# Patient Record
Sex: FEMALE | Race: WHITE | NOT HISPANIC OR LATINO | Employment: FULL TIME | URBAN - METROPOLITAN AREA
[De-identification: names, ages, dates, MRNs, and addresses within clinical notes are randomized per-mention and may not be internally consistent; named-entity substitution may affect disease eponyms.]

---

## 2022-04-24 ENCOUNTER — OFFICE VISIT (OUTPATIENT)
Dept: FAMILY MEDICINE | Facility: CLINIC | Age: 37
End: 2022-04-24
Payer: COMMERCIAL

## 2022-04-24 VITALS — SYSTOLIC BLOOD PRESSURE: 125 MMHG | TEMPERATURE: 100.3 F | DIASTOLIC BLOOD PRESSURE: 91 MMHG | HEART RATE: 62 BPM

## 2022-04-24 DIAGNOSIS — R07.0 THROAT PAIN: Primary | ICD-10-CM

## 2022-04-24 LAB
CARTRIDGE EXP DATE, RAPID STREP: NORMAL
CARTRIDGE LOT NUMBER, RAPID STREP: NORMAL
DEPRECATED S PYO AG THROAT QL EIA: NEGATIVE
INTERNAL QC, RAPID STREP: NORMAL

## 2022-04-24 PROCEDURE — 87651 STREP A DNA AMP PROBE: CPT | Performed by: NURSE PRACTITIONER

## 2022-04-24 PROCEDURE — 99203 OFFICE O/P NEW LOW 30 MIN: CPT

## 2022-04-24 ASSESSMENT — PAIN SCALES - GENERAL: PAINLEVEL: SEVERE PAIN (6)

## 2022-04-24 NOTE — PROGRESS NOTES
Assessment & Plan     Throat pain  Rapid strep negative. Is here on business till 4/27/22. Will need to call if PCR is positive. Will not call if PCR is negative. No news is good news. Declined COVID test.   - Streptococcus A Rapid Scr w Reflx to PCR  - Group A Streptococcus PCR Throat Swab      15 minutes spent on the date of the encounter doing chart review, patient visit and documentation        See Patient Instructions    Return in about 1 week (around 5/1/2022), or if symptoms worsen or fail to improve.    [facility]    Aleksandr Lewis is a 37 year old who presents for the following health issues     HPI     Acute Illness  Acute illness concerns: sore throat  Onset/Duration: 2 days  Symptoms:  Fever: YES- today in clinic  Chills/Sweats: no  Headache (location?): YES  Sinus Pressure: no  Conjunctivitis:  no  Ear Pain: no  Rhinorrhea: YES  Congestion: YES  Sore Throat: YES  Cough: no  Wheeze: no  Decreased Appetite: no  Nausea: no  Vomiting: no  Diarrhea: no  Fatigue/Achiness: YES  Sick/Strep Exposure: no  Therapies tried and outcome: aleve      Review of Systems   Constitutional, HEENT, cardiovascular, pulmonary, gi and gu systems are negative, except as otherwise noted.      Objective    BP (!) 125/91   Pulse 62   Temp 100.3  F (37.9  C)   There is no height or weight on file to calculate BMI.  Physical Exam   GENERAL: healthy, alert and no distress  EYES: Eyes grossly normal to inspection, PERRL and conjunctivae and sclerae normal  HENT: normal cephalic/atraumatic, ear canals and TM's normal, nose and mouth without ulcers or lesions, oropharynx clear, oral mucous membranes moist, tonsillar hypertrophy and tonsillar erythema  NECK: bilateral anterior cervical adenopathy  RESP: lungs clear to auscultation - no rales, rhonchi or wheezes  CV: regular rates and rhythm, normal S1 S2, no S3 or S4 and no murmur, click or  rub          Results for orders placed or performed in visit on 04/24/22   Streptococcus A Rapid Scr w Reflx to PCR     Status: None    Specimen: Throat; Swab   Result Value Ref Range    Group A Strep antigen Negative Negative    Internal QC, Rapid strep Valid     Lot Number, Rapid strep [id number]     Exp Date, Rapid strep 08-

## 2022-04-25 LAB — GROUP A STREP BY PCR: NOT DETECTED
